# Patient Record
Sex: FEMALE | Race: WHITE | NOT HISPANIC OR LATINO | Employment: FULL TIME | URBAN - METROPOLITAN AREA
[De-identification: names, ages, dates, MRNs, and addresses within clinical notes are randomized per-mention and may not be internally consistent; named-entity substitution may affect disease eponyms.]

---

## 2024-01-29 ENCOUNTER — ANESTHESIA EVENT (OUTPATIENT)
Dept: GASTROENTEROLOGY | Facility: HOSPITAL | Age: 49
End: 2024-01-29
Payer: COMMERCIAL

## 2024-01-29 ENCOUNTER — HOSPITAL ENCOUNTER (OUTPATIENT)
Facility: HOSPITAL | Age: 49
Setting detail: HOSPITAL OUTPATIENT SURGERY
Discharge: HOME | End: 2024-01-29
Attending: INTERNAL MEDICINE | Admitting: INTERNAL MEDICINE
Payer: COMMERCIAL

## 2024-01-29 ENCOUNTER — ANESTHESIA (OUTPATIENT)
Dept: GASTROENTEROLOGY | Facility: HOSPITAL | Age: 49
End: 2024-01-29
Payer: COMMERCIAL

## 2024-01-29 VITALS
HEIGHT: 63 IN | HEART RATE: 73 BPM | WEIGHT: 220.8 LBS | BODY MASS INDEX: 39.12 KG/M2 | DIASTOLIC BLOOD PRESSURE: 75 MMHG | RESPIRATION RATE: 17 BRPM | OXYGEN SATURATION: 95 % | SYSTOLIC BLOOD PRESSURE: 104 MMHG

## 2024-01-29 DIAGNOSIS — B37.31 CANDIDAL VULVOVAGINITIS: ICD-10-CM

## 2024-01-29 LAB
B-HCG UR QL: NEGATIVE
EXPIRATION DATE: NORMAL
INTERNAL NEGATIVE CONTROL: NEGATIVE
INTERNAL POSITIVE CONTROL: POSITIVE
Lab: NORMAL

## 2024-01-29 PROCEDURE — 25810000003 LACTATED RINGERS PER 1000 ML: Performed by: INTERNAL MEDICINE

## 2024-01-29 PROCEDURE — S0260 H&P FOR SURGERY: HCPCS | Performed by: INTERNAL MEDICINE

## 2024-01-29 PROCEDURE — 81025 URINE PREGNANCY TEST: CPT | Performed by: INTERNAL MEDICINE

## 2024-01-29 PROCEDURE — 25010000002 PROPOFOL 10 MG/ML EMULSION: Performed by: NURSE ANESTHETIST, CERTIFIED REGISTERED

## 2024-01-29 RX ORDER — SODIUM CHLORIDE, SODIUM LACTATE, POTASSIUM CHLORIDE, CALCIUM CHLORIDE 600; 310; 30; 20 MG/100ML; MG/100ML; MG/100ML; MG/100ML
30 INJECTION, SOLUTION INTRAVENOUS CONTINUOUS PRN
Status: DISCONTINUED | OUTPATIENT
Start: 2024-01-29 | End: 2024-01-29 | Stop reason: HOSPADM

## 2024-01-29 RX ORDER — PROPOFOL 10 MG/ML
VIAL (ML) INTRAVENOUS CONTINUOUS PRN
Status: DISCONTINUED | OUTPATIENT
Start: 2024-01-29 | End: 2024-01-29 | Stop reason: SURG

## 2024-01-29 RX ORDER — LIDOCAINE HYDROCHLORIDE 20 MG/ML
INJECTION, SOLUTION INFILTRATION; PERINEURAL AS NEEDED
Status: DISCONTINUED | OUTPATIENT
Start: 2024-01-29 | End: 2024-01-29 | Stop reason: SURG

## 2024-01-29 RX ADMIN — SODIUM CHLORIDE, POTASSIUM CHLORIDE, SODIUM LACTATE AND CALCIUM CHLORIDE 30 ML/HR: 600; 310; 30; 20 INJECTION, SOLUTION INTRAVENOUS at 07:19

## 2024-01-29 RX ADMIN — PROPOFOL 50 MG: 10 INJECTION, EMULSION INTRAVENOUS at 07:37

## 2024-01-29 RX ADMIN — PROPOFOL 300 MG: 10 INJECTION, EMULSION INTRAVENOUS at 07:56

## 2024-01-29 RX ADMIN — LIDOCAINE HYDROCHLORIDE 60 MG: 20 INJECTION, SOLUTION INFILTRATION; PERINEURAL at 07:37

## 2024-01-29 RX ADMIN — PROPOFOL 140 MCG/KG/MIN: 10 INJECTION, EMULSION INTRAVENOUS at 07:38

## 2024-01-29 NOTE — ANESTHESIA PREPROCEDURE EVALUATION
Anesthesia Evaluation     Patient summary reviewed and Nursing notes reviewed                Airway   Mallampati: III  TM distance: <3 FB  Neck ROM: full  Possible difficult intubation  Dental      Pulmonary - negative pulmonary ROS   Cardiovascular - negative cardio ROS    Rhythm: regular  Rate: normal        Neuro/Psych- negative ROS  GI/Hepatic/Renal/Endo    (+) obesity    Musculoskeletal (-) negative ROS    Abdominal    Substance History - negative use     OB/GYN negative ob/gyn ROS         Other                      Anesthesia Plan    ASA 2     MAC     intravenous induction     Anesthetic plan, risks, benefits, and alternatives have been provided, discussed and informed consent has been obtained with: patient.    Plan discussed with CRNA.    CODE STATUS:

## 2024-01-29 NOTE — H&P
"[facility]  Pre Procedure History & Physical    Chief Complaint:   Colonoscopy screening    Subjective     HPI:   Patient 49-year-old female with history of fibrocystic breast status post gastric sleeve presenting for screening.  Patient no GI complaints here for colonoscopy    Past Medical History:   Past Medical History:   Diagnosis Date    Fibrocystic breast     Screen for colon cancer 11/14/2023       Past Surgical History:  Past Surgical History:   Procedure Laterality Date    ENDOMETRIAL ABLATION      GASTRIC SLEEVE LAPAROSCOPIC      TUBAL ABDOMINAL LIGATION         Family History:  Family History   Problem Relation Age of Onset    Ovarian cancer Mother     Colon cancer Maternal Grandmother     Breast cancer Cousin        Social History:   reports that she has never smoked. She has never used smokeless tobacco. She reports that she does not drink alcohol and does not use drugs.    Medications:   Medications Prior to Admission   Medication Sig Dispense Refill Last Dose    buPROPion XL (WELLBUTRIN XL) 150 MG 24 hr tablet TAKE 1 TABLET BY MOUTH EVERY DAY   Past Month    nystatin-triamcinolone (MYCOLOG) 827905-7.1 UNIT/GM-% ointment APPLY TOPICALLY TO THE APPROPRIATE AREA AS DIRECTED 2 (TWO) TIMES A DAY. (Patient taking differently: Apply  topically to the appropriate area as directed 2 (Two) Times a Day. prn) 60 g 2 Past Week    omeprazole (priLOSEC) 20 MG capsule Take 1 capsule by mouth Daily.   1/28/2024    topiramate (TOPAMAX) 25 MG tablet Take 1 tablet by mouth 2 (Two) Times a Day.   Past Month       Allergies:  Patient has no known allergies.    ROS:    Pertinent items are noted in HPI     Objective     Blood pressure 126/72, pulse 85, resp. rate 16, height 160 cm (63\"), weight 100 kg (220 lb 12.8 oz), SpO2 96%, not currently breastfeeding.    Physical Exam   Constitutional: Pt is oriented to person, place, and time and well-developed, well-nourished, and in no distress. "   Mouth/Throat: Oropharynx is clear and moist.   Neck: Normal range of motion.   Cardiovascular: Normal rate, regular rhythm and normal heart sounds.    Pulmonary/Chest: Effort normal and breath sounds normal.   Abdominal: Soft. Nontender  Skin: Skin is warm and dry.   Psychiatric: Mood, memory, affect and judgment normal.     Assessment & Plan     Diagnosis:  Colonoscopy screening    Anticipated Surgical Procedure:  Colonoscopy    The risks, benefits, and alternatives of this procedure have been discussed with the patient or the responsible party- the patient understands and agrees to proceed.

## 2024-01-29 NOTE — BRIEF OP NOTE
COLONOSCOPY  Progress Note    Irina Antonio  1/29/2024    Pre-op Diagnosis:   Screen for colon cancer [Z12.11]       Post-Op Diagnosis Codes:     * Screen for colon cancer [Z12.11]     * Diverticulosis [K57.90]     * Internal hemorrhoids [K64.8]    Procedure/CPT® Codes:        Procedure(s):  COLONOSCOPY TO CECUM & T.I.              Surgeon(s):  Pepito Veliz MD    Anesthesia: Monitored Anesthesia Care    Staff:   Endo Technician: Emir Simpson  Endo Nurse: Antonia Morgan RN         Estimated Blood Loss: none    Urine Voided: * No values recorded between 1/29/2024  7:29 AM and 1/29/2024  7:54 AM *    Specimens:                None          Drains: * No LDAs found *    Findings: Colonoscopy the terminal ileum with normal ileum mucosa.  Sigmoid diverticulosis and internal hemorrhoids were noted the remainder the colonic mucosa was normal.        Complications: None          Pepito Veliz MD     Date: 1/29/2024  Time: 07:55 EST

## 2024-01-29 NOTE — ANESTHESIA POSTPROCEDURE EVALUATION
Patient: Irina Antonio    Procedure Summary       Date: 01/29/24 Room / Location: [facility] ENDOSCOPY 8 / [facility] ENDOSCOPY    Anesthesia Start: 0731 Anesthesia Stop: 0800    Procedure: COLONOSCOPY TO CECUM & T.I. Diagnosis:       Screen for colon cancer      Diverticulosis      Internal hemorrhoids      (Screen for colon cancer [Z12.11])    Surgeons: Pepito Veliz MD Provider: Hossein Stern MD    Anesthesia Type: MAC ASA Status: 2            Anesthesia Type: MAC    Vitals  Vitals Value Taken Time   /73 01/29/24 0808   Temp     Pulse 76 01/29/24 0824   Resp 16 01/29/24 0808   SpO2 100 % 01/29/24 0824   Vitals shown include unfiled device data.        Post Anesthesia Care and Evaluation    Patient location during evaluation: PACU  Patient participation: complete - patient participated  Level of consciousness: awake and alert  Pain management: adequate    Airway patency: patent  Anesthetic complications: No anesthetic complications    Cardiovascular status: acceptable  Respiratory status: acceptable  Hydration status: acceptable    Comments: --------------------            01/29/24               0808     --------------------   BP:       109/73     Pulse:      73       Resp:       16       SpO2:      96%      --------------------

## 2024-01-29 NOTE — DISCHARGE INSTRUCTIONS
For the next 24 hours patient needs to be with a responsible adult.    For 24 hours DO NOT drive, operate machinery, appliances, drink alcohol, make important decisions or sign legal documents.    Start with a light or bland diet if you are feeling sick to your stomach otherwise advance to regular diet as tolerated.    Follow recommendations on procedure report if provided by your doctor.    Call Dr Veliz for problems [phone]    Problems may include but not limited to: large amounts of bleeding, trouble breathing, repeated vomiting, severe unrelieved pain, fever or chills.

## 2024-03-10 DIAGNOSIS — B37.31 CANDIDAL VULVOVAGINITIS: ICD-10-CM

## 2024-03-12 RX ORDER — NYSTATIN AND TRIAMCINOLONE ACETONIDE 100000; 1 [USP'U]/G; MG/G
OINTMENT TOPICAL 2 TIMES DAILY
Qty: 60 G | Refills: 2 | Status: SHIPPED | OUTPATIENT
Start: 2024-03-12

## 2024-09-23 ENCOUNTER — PROCEDURE VISIT (OUTPATIENT)
Dept: OBSTETRICS AND GYNECOLOGY | Facility: CLINIC | Age: 49
End: 2024-09-23
Payer: COMMERCIAL

## 2024-09-23 ENCOUNTER — OFFICE VISIT (OUTPATIENT)
Dept: OBSTETRICS AND GYNECOLOGY | Facility: CLINIC | Age: 49
End: 2024-09-23
Payer: COMMERCIAL

## 2024-09-23 VITALS
HEIGHT: 63 IN | DIASTOLIC BLOOD PRESSURE: 74 MMHG | BODY MASS INDEX: 38.41 KG/M2 | SYSTOLIC BLOOD PRESSURE: 104 MMHG | WEIGHT: 216.8 LBS | HEART RATE: 80 BPM

## 2024-09-23 DIAGNOSIS — N92.6 IRREGULAR PERIODS: ICD-10-CM

## 2024-09-23 DIAGNOSIS — Z98.890 HISTORY OF ENDOMETRIAL ABLATION: ICD-10-CM

## 2024-09-23 DIAGNOSIS — Z12.31 ENCOUNTER FOR SCREENING MAMMOGRAM FOR MALIGNANT NEOPLASM OF BREAST: ICD-10-CM

## 2024-09-23 DIAGNOSIS — Z12.31 VISIT FOR SCREENING MAMMOGRAM: Primary | ICD-10-CM

## 2024-09-23 DIAGNOSIS — Z01.419 ENCOUNTER FOR WELL WOMAN EXAM: Primary | ICD-10-CM

## 2024-09-23 DIAGNOSIS — Z11.3 SCREENING FOR STD (SEXUALLY TRANSMITTED DISEASE): ICD-10-CM

## 2024-09-23 DIAGNOSIS — Z98.51 HISTORY OF TUBAL LIGATION: ICD-10-CM

## 2024-09-23 PROCEDURE — 77067 SCR MAMMO BI INCL CAD: CPT | Performed by: STUDENT IN AN ORGANIZED HEALTH CARE EDUCATION/TRAINING PROGRAM

## 2024-09-23 PROCEDURE — 77063 BREAST TOMOSYNTHESIS BI: CPT | Performed by: STUDENT IN AN ORGANIZED HEALTH CARE EDUCATION/TRAINING PROGRAM

## 2024-09-23 RX ORDER — SUMATRIPTAN 100 MG/1
1 TABLET, FILM COATED ORAL
COMMUNITY
Start: 2024-08-23 | End: 2025-08-23

## 2024-09-23 NOTE — PROGRESS NOTES
GYN Annual Exam     CC- Here for annual exam.     Irina Antonio is a 49 y.o. female who presents for annual well woman exam. She has history of endometrial ablation in . She will not have a period for 2 months, then 6 months she will have a period every month, then 2 month without. Her periods typically last for 3 days.     OB History          5    Para   5    Term   5            AB        Living             SAB        IAB        Ectopic        Molar        Multiple   1    Live Births                    Current contraception: tubal ligation  History of abnormal Pap smear: no  Family history of uterine, colon or ovarian cancer:. yes - mother diagnosed with uterine cancer at age 30 s/p hysterectomy. MGM diagnosed colon and ovarian cancer at 69.   History of abnormal mammogram: no  Family history of breast cancer:  yes - maternal cousin  at age 40   Last Pap : 23- NILM, negative HPV   Last Completed Pap Smear            PAP SMEAR (Every 3 Years) Next due on 2023  IGP,CtNgTv,Apt HPV,rfx 16 / 18,45    2020  Pap IG, Ct-Ng, Rfx HPV ASCU    2019  Pap IG, Ct-Ng, Rfx HPV ASCU    2018  Pap IG, Rfx HPV ASCU                   Last mammogram: 23- BIRADS-1  Last Completed Mammogram            Ordered - MAMMOGRAM (Every 2 Years) Ordered on 2023  Mammo Screening Digital Tomosynthesis Bilateral With CAD    2022  Mammo Screening Digital Tomosynthesis Bilateral With CAD    2021  Mammo Screening Digital Tomosynthesis Bilateral With CAD    2020  Mammo Screening Digital Tomosynthesis Bilateral With CAD    2019  Mammo Screening Bilateral With CAD    Only the first 5 history entries have been loaded, but more history exists.                   Last colonoscopy:  24- repeat in 10 years   Last Completed Colonoscopy            COLORECTAL CANCER SCREENING (COLONOSCOPY - Every 10 Years) Next due on 2024  " COLONOSCOPY    01/29/2024  Surgical Procedure: COLONOSCOPY                     Past Medical History:   Diagnosis Date    Fibrocystic breast     Screen for colon cancer 11/14/2023       Past Surgical History:   Procedure Laterality Date    COLONOSCOPY N/A 1/29/2024    Procedure: COLONOSCOPY TO CECUM & T.I.;  Surgeon: Pepito Veliz MD;  Location: [facility] ENDOSCOPY;  Service: Gastroenterology;  Laterality: N/A;  PRE- SCREENING  POST- DIVERTICULOSIS, HEMORRHOIDS    ENDOMETRIAL ABLATION      GASTRIC SLEEVE LAPAROSCOPIC      TUBAL ABDOMINAL LIGATION           Current Outpatient Medications:     buPROPion XL (WELLBUTRIN XL) 150 MG 24 hr tablet, TAKE 1 TABLET BY MOUTH EVERY DAY, Disp: , Rfl:     nystatin-triamcinolone (MYCOLOG) 161915-3.1 UNIT/GM-% ointment, Apply  topically to the appropriate area as directed 2 (Two) Times a Day. prn, Disp: 60 g, Rfl: 2    omeprazole (priLOSEC) 20 MG capsule, Take 1 capsule by mouth Daily., Disp: , Rfl:     SUMAtriptan (IMITREX) 100 MG tablet, Take 1 tablet by mouth Every 2 (Two) Hours As Needed for Migraine., Disp: , Rfl:     topiramate (TOPAMAX) 25 MG tablet, Take 1 tablet by mouth 2 (Two) Times a Day., Disp: , Rfl:     No Known Allergies    Social History     Tobacco Use    Smoking status: Never    Smokeless tobacco: Never   Vaping Use    Vaping status: Never Used   Substance Use Topics    Alcohol use: No    Drug use: No       Family History   Problem Relation Age of Onset    Ovarian cancer Mother     Colon cancer Maternal Grandmother     Breast cancer Cousin        Review of Systems   All other systems reviewed and are negative.      /74   Pulse 80   Ht 160 cm (62.99\")   Wt 98.3 kg (216 lb 12.8 oz)   BMI 38.41 kg/m²     Physical Exam  Vitals reviewed. Exam conducted with a chaperone present.   Constitutional:       General: She is not in acute distress.  HENT:      Head: Normocephalic and atraumatic.      Right Ear: External ear normal.      Left Ear: External ear " normal.   Eyes:      Extraocular Movements: Extraocular movements intact.      Pupils: Pupils are equal, round, and reactive to light.   Cardiovascular:      Rate and Rhythm: Normal rate.   Pulmonary:      Effort: Pulmonary effort is normal. No respiratory distress.   Chest:   Breasts:     Right: No swelling, bleeding, inverted nipple, mass, nipple discharge, skin change or tenderness.      Left: No swelling, bleeding, inverted nipple, mass, nipple discharge, skin change or tenderness.   Abdominal:      General: There is no distension.      Palpations: Abdomen is soft.      Tenderness: There is no abdominal tenderness. There is no guarding or rebound.   Genitourinary:     General: Normal vulva.      Exam position: Lithotomy position.      Labia:         Right: No rash, tenderness, lesion or injury.         Left: No rash, tenderness, lesion or injury.       Urethra: No prolapse or urethral swelling.      Vagina: No vaginal discharge, erythema, tenderness, bleeding or lesions.      Cervix: Normal.      Uterus: Not enlarged, not fixed and not tender.       Adnexa:         Right: No mass, tenderness or fullness.          Left: No mass, tenderness or fullness.     Musculoskeletal:         General: No deformity. Normal range of motion.      Cervical back: Normal range of motion and neck supple.   Lymphadenopathy:      Upper Body:      Right upper body: No supraclavicular or axillary adenopathy.      Left upper body: No supraclavicular or axillary adenopathy.      Lower Body: No right inguinal adenopathy. No left inguinal adenopathy.   Skin:     General: Skin is warm and dry.   Neurological:      General: No focal deficit present.      Mental Status: She is alert and oriented to person, place, and time.   Psychiatric:         Mood and Affect: Mood normal.         Behavior: Behavior normal.            Assessment     1) GYN annual well woman exam.   2) History of endometrial ablation  3) History of tubal ligation  4) Breast  cancer screening  5) STD screening   6) Irregular periods      Plan     1) Breast Health - Clinical breast exam & mammogram yearly, Self breast awareness monthly. Mammogram performed for breast cancer screening.   2) Pap - Up to date.   3) Smoking status- non-smoker.   4) Colon health - screening colonoscopy recommended if not up to date  5) Bone health - Weight bearing exercise, dietary calcium recommendations and vitamin D reviewed.   6) Activity recommends - Adult 150-300 min/week of multi-component physical activities that include balance training, aerobic and physical strengthening.    7) Contraception- history of tubal ligation   8) STD screening- Ordered HIV, RPR, Hep B surface Antigen, Hep C antibody, and collected GC/CT/Trich swab for STD screening.   9) Irregular periods- Likely related to endometrial ablation and perimenopause given her age. Will obtain FSH and estradiol level to see if they are starting to approach menopausal levels. She will be considered menopausal once she has been amenorrheic for one year.   10) Follow up prn and one year      Zenia Velazquez MD

## 2024-09-24 LAB
ESTRADIOL SERPL-MCNC: 64 PG/ML
FSH SERPL-ACNC: 44.1 MIU/ML
HBV SURFACE AG SERPL QL IA: NEGATIVE
HCV IGG SERPL QL IA: NON REACTIVE
HIV 1+2 AB+HIV1 P24 AG SERPL QL IA: NON REACTIVE
RPR SER QL: NON REACTIVE

## 2024-09-25 LAB
C TRACH RRNA SPEC QL NAA+PROBE: NEGATIVE
N GONORRHOEA RRNA SPEC QL NAA+PROBE: NEGATIVE
T VAGINALIS RRNA SPEC QL NAA+PROBE: NEGATIVE

## (undated) DEVICE — LN SMPL CO2 SHTRM SD STREAM W/M LUER

## (undated) DEVICE — TUBING, SUCTION, 1/4" X 10', STRAIGHT: Brand: MEDLINE

## (undated) DEVICE — SENSR O2 OXIMAX FNGR A/ 18IN NONSTR

## (undated) DEVICE — KT ORCA ORCAPOD DISP STRL

## (undated) DEVICE — CANN O2 ETCO2 FITS ALL CONN CO2 SMPL A/ 7IN DISP LF

## (undated) DEVICE — ADAPT CLN BIOGUARD AIR/H2O DISP